# Patient Record
Sex: FEMALE | Race: WHITE | NOT HISPANIC OR LATINO | URBAN - METROPOLITAN AREA
[De-identification: names, ages, dates, MRNs, and addresses within clinical notes are randomized per-mention and may not be internally consistent; named-entity substitution may affect disease eponyms.]

---
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
## 2024-09-25 ENCOUNTER — NON-APPOINTMENT (OUTPATIENT)
Age: 19
End: 2024-09-25

## 2024-09-25 ENCOUNTER — APPOINTMENT (OUTPATIENT)
Dept: ORTHOPEDIC SURGERY | Facility: CLINIC | Age: 19
End: 2024-09-25
Payer: COMMERCIAL

## 2024-09-25 DIAGNOSIS — G56.22 LESION OF ULNAR NERVE, LEFT UPPER LIMB: ICD-10-CM

## 2024-09-25 PROCEDURE — 73110 X-RAY EXAM OF WRIST: CPT | Mod: LT

## 2024-09-25 PROCEDURE — 99202 OFFICE O/P NEW SF 15 MIN: CPT | Mod: 25

## 2024-09-25 NOTE — ASSESSMENT
[FreeTextEntry1] : The patient comes in with complaints of pain in her left wrist that radiates into her fingers. If she holds her phone and rests her elbow on something her hand starts to feel numb. The pain started last night. She thinks it is a similar pain to her nerve entrapment in her elbow.  She was crocheting in a position last night that put pressure on her elbows. She called out of work today as the pain was too much to handle.  Patient also says that she has been tested for EDS in the past.  She is curious about getting treatment for this and if there is any doctor around that can help her.  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  She will look for someone for this.  I discussed with the patient that unfortunately I am unable to help her with this diagnosis.   The patient was advised of the diagnosis.  The natural history of the pathology was explained in full to the patient in layman's terms. We discussed the nature of the nerve as an electrical cable and what happens to the nerve in cubitaal tunnel syndrome.  We discussed that treatment for night symptoms included night bracing.  We discussed the use of nerve testing in cases when diagnosis was in doubt or for confirmation to exclude alternate pathology.  We discussed that if symptoms were 24/7 surgery was recommended to give the nerve the best chance to recover but that once symptoms were constant, the nerve may not recover even with surgery.  We discussed that if left alone the nerve progression could worsen and that treatment was indicated to prevent progression of nerve compression.  The longer the nerve is left, the more likely to cause worsening irreversible damage.  All questions were answered.  The risks and benefits of surgical and non-surgical treatment alternatives were explained in full to the patient.  I discussed with the patient making positional modifications to the elbow not bending a past 90 degrees and placing something in the antecubital fossa when she is sleeping at night.  She will try make those modifications.  She will follow-up back in 1 month for reevaluation.  If she is still having pain or no progression for improvement I will send her Medrol Dosepak.

## (undated) DEVICE — WARMING BLANKET FULL ADULT

## (undated) DEVICE — SUT CHROMIC 4-0 18" G-3

## (undated) DEVICE — SUT PLAIN GUT 4-0 18" SC-1

## (undated) DEVICE — SUT PROLENE 6-0 18" RB-2

## (undated) DEVICE — ELCTR BOVIE PENCIL BLADE 10FT

## (undated) DEVICE — SUT PLAIN GUT FAST ABSORBING 5-0 PC-1

## (undated) DEVICE — DRAPE MAYO STAND 23"

## (undated) DEVICE — PACK RHINOPLASTY

## (undated) DEVICE — SUT PROLENE 3-0 18" PS-2

## (undated) DEVICE — S&N ARTHROCARE ENT WAND TURBINATOR

## (undated) DEVICE — SOL ANTI FOG

## (undated) DEVICE — VENODYNE/SCD SLEEVE CALF MEDIUM

## (undated) DEVICE — DRSG 2X2

## (undated) DEVICE — Device

## (undated) DEVICE — BLADE MEDTRONIC ENT INFERIOR TURBINATE ROTATABLE STRAIGHT 2MM X11CM

## (undated) DEVICE — DRSG TEGADERM 2.5X3"

## (undated) DEVICE — PETRI DISH MED 3.5"

## (undated) DEVICE — SUT CHROMIC 6-0 18" S-14

## (undated) DEVICE — PREP BETADINE SPONGE STICKS

## (undated) DEVICE — APPLICATOR COTTON TIP 6"

## (undated) DEVICE — DRSG AQUAPLAST BLANK BLUSH 3 X 3"

## (undated) DEVICE — ELCTR COLORADO 3CM

## (undated) DEVICE — SUT PDS II 5-0 27" RB-1

## (undated) DEVICE — SUT PDS II 4-0 18" P-3

## (undated) DEVICE — TUBING SUCTION NONCONDUCTIVE 6MM X 12FT

## (undated) DEVICE — SUT PROLENE 6-0 18" P-1

## (undated) DEVICE — MARKING PEN W RULER

## (undated) DEVICE — GLV 7.5 PROTEXIS (WHITE)